# Patient Record
Sex: FEMALE | Race: WHITE | URBAN - METROPOLITAN AREA
[De-identification: names, ages, dates, MRNs, and addresses within clinical notes are randomized per-mention and may not be internally consistent; named-entity substitution may affect disease eponyms.]

---

## 2018-01-22 ENCOUNTER — TELEPHONE (OUTPATIENT)
Dept: FAMILY MEDICINE | Facility: OTHER | Age: 23
End: 2018-01-22

## 2018-01-22 NOTE — TELEPHONE ENCOUNTER
Any medical history I should know about?  Taking any medication on a regular basis?  First pregnancy?

## 2018-01-22 NOTE — TELEPHONE ENCOUNTER
10:47 AM    Reason for Call: Phone Call    Description: pt called and states that she would like a call back regarding her getting in to see  about her pregnancy she is 8 weeks along and would like to start seeing     Was an appointment offered for this call? No  If yes : Appointment type              Date    Preferred method for responding to this message: Telephone Call  What is your phone number ?    If we cannot reach you directly, may we leave a detailed response at the number you provided? Yes    Can this message wait until your PCP/provider returns, if available today? Not applicable, PCP is in     [facility]

## 2018-01-23 NOTE — TELEPHONE ENCOUNTER
When I called her back she has some soap and environmental allergies, not on any daily medication and first baby, I offered a quick visit this Thursday for her after work and explained we would do a clinical pregnancy test and probably order the ultrasound and make long appt when that came back I then mentioned that US are not done in our clinic but at the [facility] and that you deliver in [place], she then states she will wait for appt Has to think it over